# Patient Record
(demographics unavailable — no encounter records)

---

## 2024-10-16 NOTE — END OF VISIT
[FreeTextEntry3] :     Saw and examined patient; the above is an accurate documentation of my words and actions.   Patricia Nelson MD [facility] Pediatric Orthopedic Surgery

## 2024-10-16 NOTE — REASON FOR VISIT
[Initial Evaluation] : an initial evaluation [Patient] : patient [Mother] : mother [FreeTextEntry1] : right hip injury, DOI: 9/11/24

## 2024-10-16 NOTE — HISTORY OF PRESENT ILLNESS
[FreeTextEntry1] : Scar is a 14Y male with history of Type I DM who presents with his mother for evaluation of right hip injury sustained 9/11/2024.  He was running and felt a pop in his right hip.  Following the injury, he immediately felt pain and difficulty bearing weight on the right lower extremity.  He was initially seen at [facility] ED where he had x-rays done which were remarkable for fracture.  He was provided with crutches and referred to see peds Ortho.  He self discontinued crutches about 2 weeks ago as he started feeling better.  Denies any current hip pain.  Denies any pain medication at home.  Denies any radiating pain, numbness, tingling sensation.  Here for orthopedic evaluation and management.  The patient's HPI was reviewed thoroughly with patient and parent. The patient's parent has acted as an independent historian regarding the above information due to the unreliable nature of the history obtained from the patient.

## 2024-10-16 NOTE — DATA REVIEWED
[de-identified] : XR pelvis AP and lateral 10/16/24: There is interval healing and callus formation of the anterior superior iliac spine

## 2024-10-16 NOTE — DATA REVIEWED
[de-identified] : XR pelvis AP and lateral 10/16/24: There is interval healing and callus formation of the anterior superior iliac spine

## 2024-10-16 NOTE — PHYSICAL EXAM
[FreeTextEntry1] : Gait: Presents ambulating independently without signs of antalgia.  Good coordination and balance noted. GENERAL: alert, cooperative, in NAD SKIN: The skin is intact, warm, pink and dry over the area examined. EYES: Normal conjunctiva, normal eyelids and pupils were equal and round. ENT: normal ears, normal nose and normal lips. CARDIOVASCULAR: brisk capillary refill, but no peripheral edema. RESPIRATORY: The patient is in no apparent respiratory distress. They're taking full deep breaths without use of accessory muscles or evidence of audible wheezes or stridor without the use of a stethoscope. Normal respiratory effort. ABDOMEN: not examined  Focused exam Right hip Skin is clean and intact. Good overall alignment of lower extremities. No swelling, erythema, or ecchymosis. No lymphedema. No TTP over greater trochanter Full IR and ER of the hip without any pain Able to SLR without any difficulty  Full ROM bilateral knees/ankles. SILT, 5/5 strength EHL/FHL/ TA/GS DP 2+, Brisk cap refill <2 seconds No lymphedema

## 2024-10-16 NOTE — ASSESSMENT
[FreeTextEntry1] : Scar is a 14Y male with hx of Type I DM with right anterior superior iliac spine fracture sustained 9/11/24 Today's visit included obtaining history from the parent due to the child's age, the child could not be considered a reliable historian, requiring parent to act as independent historian  Clinical findings and imaging discussed at length with mother and patient.  X-rays pelvis performed today reviewed revealing interval healing and callus formation of right anterior superior iliac spine fracture.  At this time, we will continue to work on hip range of motion and strength at home.  In 2 weeks, he can gradually return to noncontact sports.  School note provided.  He will follow-up in 1 month for repeat clinical evaluation and x-ray pelvis.  Anticipate full activity clearance at next visit. All questions answered. Family and patient verbalize understanding of the plan.   Yael SCHAFER PA-C have acted as scribe and documented the above for Dr. Nelson

## 2024-10-16 NOTE — END OF VISIT
[FreeTextEntry3] :     Saw and examined patient; the above is an accurate documentation of my words and actions.   Patricia Nelson MD [facility] Pediatric Orthopedic Surgery

## 2024-10-16 NOTE — HISTORY OF PRESENT ILLNESS
[FreeTextEntry1] : Scar is a 14Y male with history of Type I DM who presents with his mother for evaluation of right hip injury sustained 9/11/2024.  He was running and felt a pop in his right hip.  Following the injury, he immediately felt pain and difficulty bearing weight on the right lower extremity.  He was initially seen at [facility] ED where he had x-rays done which were remarkable for fracture.  He was provided with crutches and referred to see peds Ortho.  He self discontinued crutches about 2 weeks ago as he started feeling better.  Denies any current hip pain.  Denies any pain medication at home.  Denies any radiating pain, numbness, tingling sensation.  Here for orthopedic evaluation and management.  The patient's HPI was reviewed thoroughly with patient and parent. The patient's parent has acted as an independent historian regarding the above information due to the unreliable nature of the history obtained from the patient.

## 2024-11-13 NOTE — ASSESSMENT
[FreeTextEntry1] : Scar is a 14Y male with hx of Type I DM with right anterior inferior iliac spine fracture sustained 9/11/24 Today's visit included obtaining history from the parent due to the child's age, the child could not be considered a reliable historian, requiring parent to act as independent historian  Clinical findings and imaging discussed at length with mother and patient.  X-rays pelvis performed today reviewed revealing interval healing and callus formation of right anterior superior iliac spine fracture.  At this time, he can resume full activities without any restrictions. School note provided. He will f/u on prn basis. All questions answered. Family and patient verbalize understanding of the plan.   IYael PA-C have acted as scribe and documented the above for Dr. Nelson

## 2024-11-13 NOTE — END OF VISIT
[FreeTextEntry3] :     Saw and examined patient; the above is an accurate documentation of my words and actions.   Patricia Nelson MD [facility] Pediatric Orthopedic Surgery

## 2024-11-13 NOTE — DATA REVIEWED
[de-identified] : XR pelvis AP and lateral 11/13/24: There is interval healing and callus formation of the anterior inferior iliac spine

## 2024-11-13 NOTE — REASON FOR VISIT
[Follow Up] : a follow up visit [Patient] : patient [Mother] : mother [FreeTextEntry1] : right hip injury, DOI: 9/11/24

## 2024-11-13 NOTE — HISTORY OF PRESENT ILLNESS
[FreeTextEntry1] : Scar is a 14Y male with history of Type I DM who presents with his father for follow up of right hip injury sustained 9/11/2024.  He was running and felt a pop in his right hip.  Following the injury, he immediately felt pain and difficulty bearing weight on the right lower extremity.  He was initially seen at [facility] [place] ED where he had x-rays done which were remarkable for fracture.  He was provided with crutches and referred to see peds Ortho.  He self discontinued crutches about 2 weeks ago as he started feeling better.   He returns today feeling better.  Denies any current hip pain.  Denies any pain medication at home.  Denies any radiating pain, numbness, tingling sensation.  Here for orthopedic evaluation and management.  The patient's HPI was reviewed thoroughly with patient and parent. The patient's parent has acted as an independent historian regarding the above information due to the unreliable nature of the history obtained from the patient.

## 2025-02-26 NOTE — HISTORY OF PRESENT ILLNESS
[FreeTextEntry2] : Scar is a now 15-year-3-month-old male with type 1 diabetes. diagnosed in March 2012 (2 years of age) here for follow-up on T slim insulin pump and DexCom G6. He has had persistently poor control, with A1c often >10%ile. Mother apparently has been consistent with checking his glucoses but he does snack significant amount. His A1C increased from 11.1% to >14% 9/2018 but since then had been improving, his most recent A1c May 2021 was 7.9%. His A1c was 9.5% at November 2019 visit. They continued to work on improving his intake and did carbohydrate counting logs, he was started January 2020 on T slim insulin pump and also is on DexCom G6. Previous visits it was reviewed he was only on the basal IQ to get on the control IQ but he had not been started on it yet. His last yearly laboratory studies ay 2022 showed normal TFT and TTG IgA and albumin to creatinine ratio.  They had a large gap of follow-up. It appears no follow-up was made after 8/29/22 appointment with NP, and I had not see him since August 2021 for follow-up even though diabetes patients should see their physician every other visit. They called July 10, 2023 for urgent appointment. Nursing ultimately fit them in 8/29/23 when his A1c was very poor at 11.1%. Reviewed importance of bolusing regularly, ensuring in closed mode, and changing area of injection. He did have yearly results obtained 9/21/23 which were normal.  I last saw him October 2023, and they did come with nursing visit regularly the most recent visit was August 2024. They called and requested refill script. While we put in for refills, authorization team sent in the clinicals but because he has not been seen for a period of time the supplies could not be authorized. I gave a few appointments and they were able to come in today.   He voiced he is tired all the time. He sleeps on the weekends sometimes as late as 4 am. He gets home and takes a nap, and then wake up and then stay up late. He could sleep better he agrees.  Mother voiced that they need supplies to [facility] medical which we are aware, I reviewed again it needed to be approved.  He has been mostly consistent with the pump bolus. He has tried stomach, either too itchy or come off. Thus he has continued to only try buttocks. He has not tried arms or the thighs.  Would like to be a  when he graduates. I asked if he cooks well mother voiced he makes very good fish.

## 2025-02-26 NOTE — THERAPY
[_____] : Start Time: [unfilled]     Basal: [FreeTextEntry6] : tslimx2 with Control IQ and Dexcom G7 [FreeTextEntry3] : 12 am 9 gram, 6 am 8 grams, and 9 pm 9 grams [FreeTextEntry2] : Basal insulin dosage if pump failure is 24 units

## 2025-02-26 NOTE — HISTORY OF PRESENT ILLNESS
[FreeTextEntry2] : Scar is a now 15-year-3-month-old male with type 1 diabetes. diagnosed in March 2012 (2 years of age) here for follow-up on T slim insulin pump and DexCom G6. He has had persistently poor control, with A1c often >10%ile. Mother apparently has been consistent with checking his glucoses but he does snack significant amount. His A1C increased from 11.1% to >14% 9/2018 but since then had been improving, his most recent A1c May 2021 was 7.9%. His A1c was 9.5% at November 2019 visit. They continued to work on improving his intake and did carbohydrate counting logs, he was started January 2020 on T slim insulin pump and also is on DexCom G6. Previous visits it was reviewed he was only on the basal IQ to get on the control IQ but he had not been started on it yet. His last yearly laboratory studies ay 2022 showed normal TFT and TTG IgA and albumin to creatinine ratio.  They had a large gap of follow-up. It appears no follow-up was made after 8/29/22 appointment with NP, and I had not see him since August 2021 for follow-up even though diabetes patients should see their physician every other visit. They called July 10, 2023 for urgent appointment. Nursing ultimately fit them in 8/29/23 when his A1c was very poor at 11.1%. Reviewed importance of bolusing regularly, ensuring in closed mode, and changing area of injection. He did have yearly results obtained 9/21/23 which were normal.  I last saw him October 2023, and they did come with nursing visit regularly the most recent visit was August 2024. They called and requested refill script. While we put in for refills, authorization team sent in the clinicals but because he has not been seen for a period of time the supplies could not be authorized. I gave a few appointments and they were able to come in today.   He voiced he is tired all the time. He sleeps on the weekends sometimes as late as 4 am. He gets home and takes a nap, and then wake up and then stay up late. He could sleep better he agrees.  Mother voiced that they need supplies to [facility] medical which we are aware, I reviewed again it needed to be approved.  He has been mostly consistent with the pump bolus. He has tried stomach, either too itchy or come off. Thus he has continued to only try buttocks. He has not tried arms or the thighs.  Would like to be a  when he graduates. I asked if he cooks well mother voiced he makes very good fish.

## 2025-02-26 NOTE — CONSULT LETTER
[FreeTextEntry3] : YeouChing Hsu, MD \par  Division of Pediatric Endocrinology \par  [facility] \par   of Pediatrics \par  [facility] [employer] [facility]\par

## 2025-02-26 NOTE — CONSULT LETTER
[FreeTextEntry3] : YeouChing Hsu, MD \par  Division of Pediatric Endocrinology \par  [facility] \par   of Pediatrics \par  [facility] [employer] [facility]\par